# Patient Record
Sex: FEMALE | Race: OTHER
[De-identification: names, ages, dates, MRNs, and addresses within clinical notes are randomized per-mention and may not be internally consistent; named-entity substitution may affect disease eponyms.]

---

## 2020-12-20 ENCOUNTER — HOSPITAL ENCOUNTER (EMERGENCY)
Dept: HOSPITAL 76 - ED | Age: 20
Discharge: HOME | End: 2020-12-20
Payer: COMMERCIAL

## 2020-12-20 VITALS — DIASTOLIC BLOOD PRESSURE: 70 MMHG | SYSTOLIC BLOOD PRESSURE: 112 MMHG

## 2020-12-20 DIAGNOSIS — S00.10XA: ICD-10-CM

## 2020-12-20 DIAGNOSIS — S06.0X0A: Primary | ICD-10-CM
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
## 2020-12-20 PROCEDURE — 99284 EMERGENCY DEPT VISIT MOD MDM: CPT

## 2020-12-20 PROCEDURE — 70450 CT HEAD/BRAIN W/O DYE: CPT

## 2020-12-20 NOTE — ED PHYSICIAN DOCUMENTATION
PD HPI HEAD INJURY





- Stated complaint


Stated Complaint: GLF/HEAD INJURY





- Chief complaint


Chief Complaint: Trauma Hd/Nk





- History obtained from


History obtained from: Patient, Family





- History of Present Illness


Mechanism of head injury: Fell


Where head injury occurred: Home


Timing - onset: How many days ago (3)


Location of injury: Front


Quality of pain: Pain


Associated symptoms: Neck pain, Other (facial bruising and headache).  No: LOC, 

AMS, Amnesia, Nausea / vomiting, Paresthesias, Seizures, Ear drainage, Nasal 

drainage


Symptoms improve with: Rest


Symptoms worsen with: Palpation


Contributing factors: No: Anticoagulated


Similar symptoms before: Diagnosis (concussion)


Recently seen: Not recently seen





- Additional information


Additional information: 





Previously well 20-year-old female was helping a friend move 3 days ago when she

fell down a flight of stairs.  She states that she tripped over her own feet and

she landed on her face.  She denies any loss of consciousness associated with 

this she did not get nauseated or have difficulty concentrating.  She does have 

a headache.  The headache is a persistent symptom that she has had.  She denies 

any paresthesias she does have some stiffness to her neck is able to move around

quite easily without difficulty.





She is able to move her eyes without pain has no visual changes does have some 

black and blues to both eyes and some pain across her forehead and not to her 

cheeks or the sides of her head.





Review of Systems


Constitutional: denies: Fever


Eyes: denies: Loss of vision, Decreased vision, Photophobia, Discharge, 

Irritation


Ears: denies: Ear pain


Nose: denies: Rhinorrhea / runny nose, Congestion


Throat: denies: Sore throat


Cardiac: denies: Chest pain / pressure


Respiratory: denies: Dyspnea, Cough


GI: denies: Nausea, Vomiting


: denies: Dysuria, Frequency





PD PAST MEDICAL HISTORY





- Present Medications


Home Medications: 


                                Ambulatory Orders











 Medication  Instructions  Recorded  Confirmed


 


No Known Home Medications  12/20/20 12/20/20














- Allergies


Allergies/Adverse Reactions: 


                                    Allergies











Allergy/AdvReac Type Severity Reaction Status Date / Time


 


No Known Drug Allergies Allergy   Verified 12/20/20 12:51














PD ED PE NORMAL





- Vitals


Vital signs reviewed: Yes (Wide pulse pressure)





- General


General: Alert and oriented X 3, No acute distress, Well developed/nourished





- HEENT


HEENT: PERRL, EOMI, Other (There is ecchymosis to the lower lids bilaterally and

there is no tenderness to the cheeks or the infraorbital area or the zygomatic 

arch bilaterally.  There is some tenderness to the forehead above the left 

eyebrow without crepitance or step-off.)





- Neck


Neck: Supple, no meningeal sign, No bony TTP, Other (There is mild tenderness to

the paraspinous muscles bilaterally which are stiffened)





- Respiratory


Respiratory: No respiratory distress





- Derm


Derm: Normal color, Warm and dry, No rash





- Extremities


Extremities: No deformity, No edema





- Neuro


Neuro: Alert and oriented X 3, CNs 2-12 intact, No motor deficit, No sensory 

deficit, Normal speech


Eye Opening: Spontaneous


Motor: Obeys Commands


Verbal: Oriented


GCS Score: 15





- Psych


Psych: Normal mood, Normal affect





Results





- Vitals


Vitals: 


                               Vital Signs - 24 hr











  12/20/20





  12:52


 


Temperature 36.6 C


 


Heart Rate 62


 


Respiratory 16





Rate 


 


Blood Pressure 116/59 L


 


O2 Saturation 100








                                     Oxygen











O2 Source                      Room air

















- Rads (name of study)


  ** CT head without 


Radiology: Prelim report reviewed (Impression: No acute intracranial 

abnormality.), EMP read indepedently, See rad report





PD MEDICAL DECISION MAKING





- ED course


Complexity details: reviewed results, re-evaluated patient, considered 

differential, d/w patient


ED course: 





19 y/o female with facial contusion and concussion with residual headache has no

evidence of ICH on CT scanning. There is no evidence of facial fracture. 





Departure





- Departure


Disposition: 01 Home, Self Care


Clinical Impression: 


Concussion


Qualifiers:


 Encounter type: initial encounter Loss of consciousness presence/duration: 

without LOC Qualified Code(s): S06.0X0A - Concussion without loss of 

consciousness, initial encounter





Condition: Stable


Instructions:  ED Concussion


Follow-Up: 


[facility] [place] [Provider Group]

## 2020-12-20 NOTE — CT REPORT
PROCEDURE:  HEAD WO

 

INDICATIONS:  concussion w/o loc

 

TECHNIQUE:  

Noncontrast 4.5 mm thick angled axial sections acquired from the foramen magnum to the vertex.  For r
adiation dose reduction, the following was used:  automated exposure control, adjustment of mA and/or
 kV according to patient size.

 

COMPARISON:  None.

 

FINDINGS:  

Image quality:  Excellent.  

 

CSF spaces:  Basal cisterns are patent.  No extra-axial fluid collections.  Ventricles are normal in 
size and shape.  

 

Brain:  No midline shift.  No intracranial masses or hemorrhage.  Gray-white matter interface is norm
al.  

 

Skull and face:  Calvarium and visualized facial bones are intact, without suspicious lesions. Questi
on of mild contusion at the left for head. 

 

Sinuses:  Visualized sinuses and mastoids are clear.  

 

IMPRESSION:  

No acute intracranial abnormality.

 

Reviewed by: Kota Arroyo MD on 12/20/2020 1:13 PM [facility]

Approved by: Kota Arroyo MD on 12/20/2020 1:13 PM [facility]

 

 

Station ID:  [id number]